# Patient Record
Sex: FEMALE | Race: WHITE | Employment: FULL TIME | URBAN - METROPOLITAN AREA
[De-identification: names, ages, dates, MRNs, and addresses within clinical notes are randomized per-mention and may not be internally consistent; named-entity substitution may affect disease eponyms.]

---

## 2017-08-21 ENCOUNTER — OFFICE VISIT (OUTPATIENT)
Dept: OBGYN CLINIC | Facility: CLINIC | Age: 42
End: 2017-08-21

## 2017-08-21 VITALS
DIASTOLIC BLOOD PRESSURE: 64 MMHG | BODY MASS INDEX: 24.1 KG/M2 | SYSTOLIC BLOOD PRESSURE: 118 MMHG | WEIGHT: 136 LBS | HEIGHT: 63 IN | HEART RATE: 66 BPM

## 2017-08-21 DIAGNOSIS — R53.83 OTHER FATIGUE: ICD-10-CM

## 2017-08-21 DIAGNOSIS — Z01.419 WELL WOMAN EXAM: Primary | ICD-10-CM

## 2017-08-21 DIAGNOSIS — Z12.4 CERVICAL CANCER SCREENING: ICD-10-CM

## 2017-08-21 DIAGNOSIS — Z00.00 ROUTINE HEALTH MAINTENANCE: ICD-10-CM

## 2017-08-21 DIAGNOSIS — N92.3 INTERMENSTRUAL SPOTTING: ICD-10-CM

## 2017-08-21 DIAGNOSIS — Z12.39 BREAST CANCER SCREENING: ICD-10-CM

## 2017-08-21 DIAGNOSIS — N94.6 DYSMENORRHEA: ICD-10-CM

## 2017-08-21 PROCEDURE — 99396 PREV VISIT EST AGE 40-64: CPT | Performed by: OBSTETRICS & GYNECOLOGY

## 2017-08-21 NOTE — PROGRESS NOTES
GYN H&P     2017  1:39 PM    CC: Patient presents with:  Physical: cycle is 25 days instead of 28 to 30, can't focus, nausea, spotting mid cycle, tired      HPI: patient is a 36year old  here for her annual gyne exam.   She has no major compla OR SHORTNESS OF BREATH. Disp: 18 Inhaler Rfl: 0     No current facility-administered medications on file prior to visit.    Family History   Problem Relation Age of Onset   • Heart Disorder Maternal Grandfather      cad -  of mi   • Diabetes Maternal Gr apparent distress  SKIN: no rashes, no suspicious lesions  HEENT: normal  NECK: no thyroidmegaly, no adenopathy  LUNGS: clear to auscultation  CARDIOVASCULAR: normal S1, S2, RRR  BREASTS: soft, nontendder, no palpable masses or nodes, no nipple discharge,

## 2017-09-16 ENCOUNTER — HOSPITAL ENCOUNTER (OUTPATIENT)
Dept: ULTRASOUND IMAGING | Facility: HOSPITAL | Age: 42
Discharge: HOME | End: 2017-09-16
Attending: OBSTETRICS & GYNECOLOGY
Payer: COMMERCIAL

## 2017-09-16 ENCOUNTER — LAB ENCOUNTER (OUTPATIENT)
Dept: LAB | Facility: HOSPITAL | Age: 42
End: 2017-09-16
Attending: OBSTETRICS & GYNECOLOGY
Payer: COMMERCIAL

## 2017-09-16 DIAGNOSIS — N92.3 INTERMENSTRUAL SPOTTING: ICD-10-CM

## 2017-09-16 DIAGNOSIS — R53.83 OTHER FATIGUE: ICD-10-CM

## 2017-09-16 DIAGNOSIS — Z00.00 ROUTINE HEALTH MAINTENANCE: ICD-10-CM

## 2017-09-16 LAB
ALBUMIN SERPL-MCNC: 3.9 G/DL (ref 3.5–4.8)
ALP LIVER SERPL-CCNC: 37 U/L (ref 37–98)
ALT SERPL-CCNC: 18 U/L (ref 14–54)
AST SERPL-CCNC: 8 U/L (ref 15–41)
BASOPHILS # BLD AUTO: 0.03 X10(3) UL (ref 0–0.1)
BASOPHILS NFR BLD AUTO: 0.5 %
BILIRUB SERPL-MCNC: 0.4 MG/DL (ref 0.1–2)
BUN BLD-MCNC: 11 MG/DL (ref 8–20)
CALCIUM BLD-MCNC: 9.4 MG/DL (ref 8.3–10.3)
CHLORIDE: 102 MMOL/L (ref 101–111)
CO2: 26 MMOL/L (ref 22–32)
CREAT BLD-MCNC: 0.64 MG/DL (ref 0.55–1.02)
EOSINOPHIL # BLD AUTO: 0.26 X10(3) UL (ref 0–0.3)
EOSINOPHIL NFR BLD AUTO: 4.1 %
ERYTHROCYTE [DISTWIDTH] IN BLOOD BY AUTOMATED COUNT: 12.4 % (ref 11.5–16)
FREE T4: 1.1 NG/DL (ref 0.9–1.8)
GLUCOSE BLD-MCNC: 89 MG/DL (ref 70–99)
HCT VFR BLD AUTO: 39 % (ref 34–50)
HGB BLD-MCNC: 12.9 G/DL (ref 12–16)
IMMATURE GRANULOCYTE COUNT: 0.01 X10(3) UL (ref 0–1)
IMMATURE GRANULOCYTE RATIO %: 0.2 %
LYMPHOCYTES # BLD AUTO: 1.3 X10(3) UL (ref 0.9–4)
LYMPHOCYTES NFR BLD AUTO: 20.7 %
M PROTEIN MFR SERPL ELPH: 7.7 G/DL (ref 6.1–8.3)
MCH RBC QN AUTO: 29.8 PG (ref 27–33.2)
MCHC RBC AUTO-ENTMCNC: 33.1 G/DL (ref 31–37)
MCV RBC AUTO: 90.1 FL (ref 81–100)
MONOCYTES # BLD AUTO: 0.67 X10(3) UL (ref 0.1–0.6)
MONOCYTES NFR BLD AUTO: 10.7 %
NEUTROPHIL ABS PRELIM: 4.01 X10 (3) UL (ref 1.3–6.7)
NEUTROPHILS # BLD AUTO: 4.01 X10(3) UL (ref 1.3–6.7)
NEUTROPHILS NFR BLD AUTO: 63.8 %
PLATELET # BLD AUTO: 225 10(3)UL (ref 150–450)
POTASSIUM SERPL-SCNC: 3.8 MMOL/L (ref 3.6–5.1)
RBC # BLD AUTO: 4.33 X10(6)UL (ref 3.8–5.1)
RED CELL DISTRIBUTION WIDTH-SD: 41 FL (ref 35.1–46.3)
SODIUM SERPL-SCNC: 137 MMOL/L (ref 136–144)
TSI SER-ACNC: 1.17 MIU/ML (ref 0.35–5.5)
WBC # BLD AUTO: 6.3 X10(3) UL (ref 4–13)

## 2017-09-16 PROCEDURE — 36415 COLL VENOUS BLD VENIPUNCTURE: CPT

## 2017-09-16 PROCEDURE — 84439 ASSAY OF FREE THYROXINE: CPT

## 2017-09-16 PROCEDURE — 80053 COMPREHEN METABOLIC PANEL: CPT

## 2017-09-16 PROCEDURE — 85025 COMPLETE CBC W/AUTO DIFF WBC: CPT

## 2017-09-16 PROCEDURE — 76830 TRANSVAGINAL US NON-OB: CPT | Performed by: OBSTETRICS & GYNECOLOGY

## 2017-09-16 PROCEDURE — 76856 US EXAM PELVIC COMPLETE: CPT | Performed by: OBSTETRICS & GYNECOLOGY

## 2017-09-16 PROCEDURE — 84443 ASSAY THYROID STIM HORMONE: CPT

## 2018-08-16 ENCOUNTER — HOSPITAL ENCOUNTER (OUTPATIENT)
Dept: MAMMOGRAPHY | Facility: HOSPITAL | Age: 43
Discharge: HOME | End: 2018-08-16
Attending: OBSTETRICS & GYNECOLOGY
Payer: COMMERCIAL

## 2018-08-16 DIAGNOSIS — Z12.39 BREAST CANCER SCREENING: ICD-10-CM

## 2018-08-16 PROCEDURE — 77067 SCR MAMMO BI INCL CAD: CPT | Performed by: OBSTETRICS & GYNECOLOGY

## 2018-08-16 PROCEDURE — 77063 BREAST TOMOSYNTHESIS BI: CPT | Performed by: OBSTETRICS & GYNECOLOGY

## 2019-01-12 ENCOUNTER — OFFICE VISIT (OUTPATIENT)
Dept: OBGYN CLINIC | Facility: CLINIC | Age: 44
End: 2019-01-12
Payer: COMMERCIAL

## 2019-01-12 VITALS
WEIGHT: 131.19 LBS | HEART RATE: 74 BPM | SYSTOLIC BLOOD PRESSURE: 110 MMHG | DIASTOLIC BLOOD PRESSURE: 68 MMHG | HEIGHT: 63 IN | BODY MASS INDEX: 23.25 KG/M2

## 2019-01-12 DIAGNOSIS — N93.9 ABNORMAL UTERINE BLEEDING (AUB): ICD-10-CM

## 2019-01-12 DIAGNOSIS — Z12.4 CERVICAL CANCER SCREENING: ICD-10-CM

## 2019-01-12 DIAGNOSIS — Z12.39 BREAST CANCER SCREENING: ICD-10-CM

## 2019-01-12 DIAGNOSIS — Z01.419 WELL WOMAN EXAM WITH ROUTINE GYNECOLOGICAL EXAM: Primary | ICD-10-CM

## 2019-01-12 PROCEDURE — 87624 HPV HI-RISK TYP POOLED RSLT: CPT | Performed by: OBSTETRICS & GYNECOLOGY

## 2019-01-12 PROCEDURE — 99396 PREV VISIT EST AGE 40-64: CPT | Performed by: OBSTETRICS & GYNECOLOGY

## 2019-01-12 RX ORDER — MISOPROSTOL 200 UG/1
200 TABLET ORAL 2 TIMES DAILY
Qty: 2 TABLET | Refills: 0 | Status: SHIPPED | OUTPATIENT
Start: 2019-01-12 | End: 2020-02-11

## 2019-01-12 RX ORDER — MULTIVIT-MIN/IRON FUM/FOLIC AC 7.5 MG-4
1 TABLET ORAL DAILY
COMMUNITY
End: 2021-06-01

## 2019-01-12 NOTE — PROGRESS NOTES
[facility]  Obstetrics and Gynecology  History & Physical    CC: Patient is here for annual well woman exam     Subjective:     HPI: Mayelin Phan is a 39year old  female here for annual well women exam.  Patient reports AUB.  She r Immunization History:     Immunization History  Administered            Date(s) Administered    None  Deferred                Date(s) Deferred    Influenza Vaccine Refused                          04/04/2018      PSH:  Past Surgical History:   Proced Grandfather         cad -  of mi   • Diabetes Maternal Grandmother    • Cancer Father         lung   • Hypertension Neg    • Lipids Neg        Health maintenance:  Mammogram (age 35 and q1-2yr): 2018  Colonoscopy (age 47 and q7yr): n/a    Review of order for screening mammogram provided, due 08/2019  AUB  - a/w irregular spotting   - lab ordered  - pelvic US ordered  - advised endometrial sampling due to persistent symptoms since 2017  - discussion held with the patient regarding EMB procedure includ

## 2019-01-17 LAB
HPV I/H RISK 1 DNA SPEC QL NAA+PROBE: NEGATIVE
LAST PAP RESULT: NORMAL
PAP HISTORY (OTHER THAN LAST PAP): NORMAL

## 2019-01-26 ENCOUNTER — HOSPITAL ENCOUNTER (OUTPATIENT)
Dept: ULTRASOUND IMAGING | Facility: HOSPITAL | Age: 44
Discharge: HOME | End: 2019-01-26
Attending: OBSTETRICS & GYNECOLOGY
Payer: COMMERCIAL

## 2019-01-26 ENCOUNTER — LAB ENCOUNTER (OUTPATIENT)
Dept: LAB | Facility: HOSPITAL | Age: 44
End: 2019-01-26
Attending: OBSTETRICS & GYNECOLOGY
Payer: COMMERCIAL

## 2019-01-26 DIAGNOSIS — N93.9 ABNORMAL UTERINE BLEEDING (AUB): ICD-10-CM

## 2019-01-26 LAB
B-HCG SERPL-ACNC: <1 MIU/ML
BASOPHILS # BLD AUTO: 0.06 X10(3) UL (ref 0–0.1)
BASOPHILS NFR BLD AUTO: 0.8 %
EOSINOPHIL # BLD AUTO: 0.1 X10(3) UL (ref 0–0.3)
EOSINOPHIL NFR BLD AUTO: 1.3 %
ERYTHROCYTE [DISTWIDTH] IN BLOOD BY AUTOMATED COUNT: 12.7 % (ref 11.5–16)
ESTRADIOL: 389.4 PG/ML
FSH SERPL-ACNC: 11.9 MIU/ML
HCT VFR BLD AUTO: 38.1 % (ref 34–50)
HGB BLD-MCNC: 12.2 G/DL (ref 12–16)
IMMATURE GRANULOCYTE COUNT: 0.02 X10(3) UL (ref 0–1)
IMMATURE GRANULOCYTE RATIO %: 0.3 %
LH: 27.2 MIU/ML
LYMPHOCYTES # BLD AUTO: 1.96 X10(3) UL (ref 0.9–4)
LYMPHOCYTES NFR BLD AUTO: 24.9 %
MCH RBC QN AUTO: 29.3 PG (ref 27–33.2)
MCHC RBC AUTO-ENTMCNC: 32 G/DL (ref 31–37)
MCV RBC AUTO: 91.6 FL (ref 81–100)
MONOCYTES # BLD AUTO: 0.79 X10(3) UL (ref 0.1–1)
MONOCYTES NFR BLD AUTO: 10 %
NEUTROPHIL ABS PRELIM: 4.94 X10 (3) UL (ref 1.3–6.7)
NEUTROPHILS # BLD AUTO: 4.94 X10(3) UL (ref 1.3–6.7)
NEUTROPHILS NFR BLD AUTO: 62.7 %
PLATELET # BLD AUTO: 270 10(3)UL (ref 150–450)
PROLACTIN: 15.3 NG/ML
RBC # BLD AUTO: 4.16 X10(6)UL (ref 3.8–5.1)
RED CELL DISTRIBUTION WIDTH-SD: 42.5 FL (ref 35.1–46.3)
TSI SER-ACNC: 1.13 MIU/ML (ref 0.35–5.5)
WBC # BLD AUTO: 7.9 X10(3) UL (ref 4–13)

## 2019-01-26 PROCEDURE — 84146 ASSAY OF PROLACTIN: CPT

## 2019-01-26 PROCEDURE — 82670 ASSAY OF TOTAL ESTRADIOL: CPT

## 2019-01-26 PROCEDURE — 76856 US EXAM PELVIC COMPLETE: CPT | Performed by: OBSTETRICS & GYNECOLOGY

## 2019-01-26 PROCEDURE — 84702 CHORIONIC GONADOTROPIN TEST: CPT

## 2019-01-26 PROCEDURE — 85025 COMPLETE CBC W/AUTO DIFF WBC: CPT

## 2019-01-26 PROCEDURE — 76830 TRANSVAGINAL US NON-OB: CPT | Performed by: OBSTETRICS & GYNECOLOGY

## 2019-01-26 PROCEDURE — 83001 ASSAY OF GONADOTROPIN (FSH): CPT

## 2019-01-26 PROCEDURE — 83002 ASSAY OF GONADOTROPIN (LH): CPT

## 2019-01-26 PROCEDURE — 84443 ASSAY THYROID STIM HORMONE: CPT

## 2019-01-26 PROCEDURE — 36415 COLL VENOUS BLD VENIPUNCTURE: CPT

## 2019-01-28 NOTE — PROGRESS NOTES
Please inform patient of normal AUB lab testing  Pelvic US noted for mild thickened endometrium. Advise to continue with plan for EMB for further evaluation.

## 2019-03-01 ENCOUNTER — OFFICE VISIT (OUTPATIENT)
Dept: OBGYN CLINIC | Facility: CLINIC | Age: 44
End: 2019-03-01
Payer: COMMERCIAL

## 2019-03-01 VITALS
BODY MASS INDEX: 23.04 KG/M2 | SYSTOLIC BLOOD PRESSURE: 112 MMHG | WEIGHT: 130 LBS | HEART RATE: 61 BPM | DIASTOLIC BLOOD PRESSURE: 60 MMHG | HEIGHT: 63 IN

## 2019-03-01 DIAGNOSIS — Z01.812 PRE-PROCEDURE LAB EXAM: ICD-10-CM

## 2019-03-01 DIAGNOSIS — N93.9 ABNORMAL UTERINE BLEEDING (AUB): Primary | ICD-10-CM

## 2019-03-01 LAB — CONTROL LINE PRESENT WITH A CLEAR BACKGROUND (YES/NO): YES YES/NO

## 2019-03-01 PROCEDURE — 88305 TISSUE EXAM BY PATHOLOGIST: CPT | Performed by: OBSTETRICS & GYNECOLOGY

## 2019-03-01 PROCEDURE — 58100 BIOPSY OF UTERUS LINING: CPT | Performed by: OBSTETRICS & GYNECOLOGY

## 2019-03-01 PROCEDURE — 81025 URINE PREGNANCY TEST: CPT | Performed by: OBSTETRICS & GYNECOLOGY

## 2019-03-01 NOTE — PATIENT INSTRUCTIONS
[facility] Department of OB/GYN  After Care Instructions for Endometrial Biopsy      Biopsy Results   You will receive a phone call with your biopsy results in 7 business days. If you have not received your results in 7 days, please contact our office.   The resu pills. Use one of the following approaches:  First day of period start: Start your first pack of pills on the day of your period. No back-up method is required. Sunday start: Start your first pack of pills on the first Sunday after your period begins.   Sulema Barthel vomiting. 3). If you miss your period  • It is not uncommon for your periods to become lighter while taking birth control pills or miss your period all together.   If you missed any pills in the pack prior to this occurring, you should take a home pregnanc If the spotting persists through more than 3 packs of pills, contact your doctor to confirm that you should stay on that brand of pills. 8).  If you need to take any other medications, including antibiotics, check with the pharmacist to see if there will b

## 2019-03-01 NOTE — PROCEDURES
Procedure: Endometrial biopsy     Date of Procedure: 19    Pre-procedure diagnosis:  AUB    Post-procedure diagnosis:   AUB    Indications:   39year old female  who presents for endometrial biopsy / AUB    Procedure details:   The procedure,

## 2019-03-03 PROBLEM — N93.9 ABNORMAL UTERINE BLEEDING (AUB): Status: ACTIVE | Noted: 2019-03-03

## 2019-03-04 RX ORDER — MEDROXYPROGESTERONE ACETATE 10 MG/1
10 TABLET ORAL DAILY
Qty: 10 TABLET | Refills: 0 | Status: SHIPPED | OUTPATIENT
Start: 2019-03-04 | End: 2020-02-11

## 2019-03-04 NOTE — PROGRESS NOTES
Please inform patient of normal EMB negative for malignancy   Polyploid fragments are suggestive of little growths of endometrial tissue which may be cause her intermenstrual bleeding    I would suggest OCP therapy, she may trial OCP for 3 months and then

## 2019-03-04 NOTE — PROGRESS NOTES
39year old female  who presents for endometrial biopsy / AUB. D/w patient management of symptoms including observation, medical and surgical. Patient declines surgical management. She states she might consider OCP.  Patient provided with OCP infor

## 2019-03-05 NOTE — PROGRESS NOTES
Patient notified of results and treatment options. Pt would like to try Provera. Prescription sent to pharmacy.

## 2019-11-13 ENCOUNTER — HOSPITAL ENCOUNTER (OUTPATIENT)
Dept: MAMMOGRAPHY | Facility: HOSPITAL | Age: 44
Discharge: HOME | End: 2019-11-13
Attending: OBSTETRICS & GYNECOLOGY
Payer: COMMERCIAL

## 2019-11-13 DIAGNOSIS — Z12.39 BREAST CANCER SCREENING: ICD-10-CM

## 2019-11-13 PROCEDURE — 77063 BREAST TOMOSYNTHESIS BI: CPT | Performed by: OBSTETRICS & GYNECOLOGY

## 2019-11-13 PROCEDURE — 77067 SCR MAMMO BI INCL CAD: CPT | Performed by: OBSTETRICS & GYNECOLOGY

## 2020-02-11 ENCOUNTER — TELEPHONE (OUTPATIENT)
Dept: OBGYN CLINIC | Facility: CLINIC | Age: 45
End: 2020-02-11

## 2020-02-11 ENCOUNTER — OFFICE VISIT (OUTPATIENT)
Dept: OBGYN CLINIC | Facility: CLINIC | Age: 45
End: 2020-02-11
Payer: COMMERCIAL

## 2020-02-11 VITALS — SYSTOLIC BLOOD PRESSURE: 130 MMHG | DIASTOLIC BLOOD PRESSURE: 82 MMHG | WEIGHT: 119.38 LBS | BODY MASS INDEX: 21 KG/M2

## 2020-02-11 DIAGNOSIS — N76.2 ACUTE VULVITIS: Primary | ICD-10-CM

## 2020-02-11 PROCEDURE — 99213 OFFICE O/P EST LOW 20 MIN: CPT | Performed by: NURSE PRACTITIONER

## 2020-02-11 PROCEDURE — 87480 CANDIDA DNA DIR PROBE: CPT | Performed by: NURSE PRACTITIONER

## 2020-02-11 PROCEDURE — 87510 GARDNER VAG DNA DIR PROBE: CPT | Performed by: NURSE PRACTITIONER

## 2020-02-11 PROCEDURE — 87660 TRICHOMONAS VAGIN DIR PROBE: CPT | Performed by: NURSE PRACTITIONER

## 2020-02-11 NOTE — TELEPHONE ENCOUNTER
38year old patient complaining of vulvar swelling and irritation x 2 days. Symptoms have been worsening. Denies any changes to discharge. Denies any fever/chills. Complaints are all exterior. Is not sure if there are any changes to the appearance.  Isn't

## 2020-02-11 NOTE — PROGRESS NOTES
Gyne note     S: patient is a 38year old yo  here for a 5 day history of vulvar swelling and irritation. She has had occasional itching, slight discharge with odor.  She also notes she had a few episodes where it felt like her skin had cuts that ble

## 2020-02-11 NOTE — TELEPHONE ENCOUNTER
Patient states that she is having vaginal swelling with some burning. She states that it is on the outside of the vagina not in the inside. This has been going on for 2 days.  Please call to advise

## 2020-02-12 ENCOUNTER — TELEPHONE (OUTPATIENT)
Dept: OBGYN CLINIC | Facility: CLINIC | Age: 45
End: 2020-02-12

## 2020-02-12 RX ORDER — FLUCONAZOLE 150 MG/1
TABLET ORAL
Qty: 3 TABLET | Refills: 0 | Status: SHIPPED | OUTPATIENT
Start: 2020-02-12 | End: 2020-11-12 | Stop reason: ALTCHOICE

## 2020-02-12 NOTE — TELEPHONE ENCOUNTER
Patient with + yeast. VO from [facility]: Diflucan 150 mg PO qod x3 doses, #3, no refill. Contacted patient. Reported results and instructed on treatment. Verified pharmacy location and allergies before sending rx. Patient states understanding.

## 2021-05-08 ENCOUNTER — OFFICE VISIT (OUTPATIENT)
Dept: OBGYN CLINIC | Facility: CLINIC | Age: 46
End: 2021-05-08
Payer: COMMERCIAL

## 2021-05-08 VITALS
HEIGHT: 63 IN | HEART RATE: 76 BPM | SYSTOLIC BLOOD PRESSURE: 122 MMHG | BODY MASS INDEX: 23.92 KG/M2 | DIASTOLIC BLOOD PRESSURE: 76 MMHG | WEIGHT: 135 LBS

## 2021-05-08 DIAGNOSIS — N92.1 MENORRHAGIA WITH IRREGULAR CYCLE: ICD-10-CM

## 2021-05-08 DIAGNOSIS — N76.0 BV (BACTERIAL VAGINOSIS): ICD-10-CM

## 2021-05-08 DIAGNOSIS — N93.9 VAGINAL SPOTTING: ICD-10-CM

## 2021-05-08 DIAGNOSIS — R10.2 PELVIC PAIN: ICD-10-CM

## 2021-05-08 DIAGNOSIS — B96.89 BV (BACTERIAL VAGINOSIS): ICD-10-CM

## 2021-05-08 DIAGNOSIS — G89.18 PAIN FOLLOWING SURGERY OR PROCEDURE: ICD-10-CM

## 2021-05-08 DIAGNOSIS — Z12.31 ENCOUNTER FOR SCREENING MAMMOGRAM FOR BREAST CANCER: ICD-10-CM

## 2021-05-08 DIAGNOSIS — N93.9 ABNORMAL UTERINE BLEEDING (AUB): Primary | ICD-10-CM

## 2021-05-08 DIAGNOSIS — N64.4 BREAST PAIN: ICD-10-CM

## 2021-05-08 PROCEDURE — 3078F DIAST BP <80 MM HG: CPT | Performed by: OBSTETRICS & GYNECOLOGY

## 2021-05-08 PROCEDURE — 58100 BIOPSY OF UTERUS LINING: CPT | Performed by: OBSTETRICS & GYNECOLOGY

## 2021-05-08 PROCEDURE — 3074F SYST BP LT 130 MM HG: CPT | Performed by: OBSTETRICS & GYNECOLOGY

## 2021-05-08 PROCEDURE — 99215 OFFICE O/P EST HI 40 MIN: CPT | Performed by: OBSTETRICS & GYNECOLOGY

## 2021-05-08 PROCEDURE — 3008F BODY MASS INDEX DOCD: CPT | Performed by: OBSTETRICS & GYNECOLOGY

## 2021-05-08 PROCEDURE — 87510 GARDNER VAG DNA DIR PROBE: CPT | Performed by: OBSTETRICS & GYNECOLOGY

## 2021-05-08 PROCEDURE — 87660 TRICHOMONAS VAGIN DIR PROBE: CPT | Performed by: OBSTETRICS & GYNECOLOGY

## 2021-05-08 PROCEDURE — 87480 CANDIDA DNA DIR PROBE: CPT | Performed by: OBSTETRICS & GYNECOLOGY

## 2021-05-08 RX ORDER — METRONIDAZOLE 500 MG/1
500 TABLET ORAL 2 TIMES DAILY
Qty: 14 TABLET | Refills: 0 | Status: SHIPPED | OUTPATIENT
Start: 2021-05-08 | End: 2021-06-01

## 2021-05-08 RX ORDER — IBUPROFEN 600 MG/1
600 TABLET ORAL ONCE
Status: SHIPPED | OUTPATIENT
Start: 2021-05-08

## 2021-05-08 NOTE — PATIENT INSTRUCTIONS
You may use Motrin 600 mg by mouth every 6 hours as needed for pain following your procedure. You may also alternate with Tylenol 500 mg by mouth every 6 hours as needed for pain.   Therefore, you may take an over-the-counter pain medication every 3 hours

## 2021-05-08 NOTE — PROCEDURES
Procedure: Endometrial biopsy     Date of Procedure: 21    Pre-procedure diagnosis:  AUB    Post-procedure diagnosis:   AUB    Indications:   51year old female  who presents for endometrial biopsy  AUB    Procedure details:   The procedure,

## 2021-05-08 NOTE — PROGRESS NOTES
[facility]  Obstetrics and Gynecology  Follow Up Progress Note    Subjective:     Mansoor Bonilla is a 51year old  female who was last seen in office 2020 and presents with c/o AUB and breast pain.  The patient was recommended to r HFA (VENTOLIN HFA) 108 (90 Base) MCG/ACT Inhalation Aero Soln, Inhale 2 puffs into the lungs every 6 (six) hours as needed for Wheezing or Shortness of Breath., Disp: 18 g, Rfl: 1  Multiple Vitamins-Minerals (MULTI-VITAMIN/MINERALS) Oral Tab, Take 1 tablet edema    Labs:  03/01/2019  Final Diagnosis:   Endometrial biopsy:  -Polypoid fragments of endometrial mucosa with secretory change.          Imaging:  Pelvic US 01/26/2019  FINDINGS:                  UTERUS:  8.45 cm x 4.61 cm x 4.98 cm     Endometrium Thi results and pelvic ultrasound results  -Patient agreeable  Precautions provided  Left sided pelvic pain   - physical exam wnl   - recommend repeat pelvic US   - precautions provided   Breast pain  -Right-sided breast pain worse with menses  -Physical exam

## 2021-05-27 ENCOUNTER — LAB ENCOUNTER (OUTPATIENT)
Dept: LAB | Facility: REFERENCE LAB | Age: 46
End: 2021-05-27
Attending: OBSTETRICS & GYNECOLOGY
Payer: COMMERCIAL

## 2021-05-27 DIAGNOSIS — N93.9 ABNORMAL UTERINE BLEEDING (AUB): ICD-10-CM

## 2021-05-27 PROCEDURE — 84443 ASSAY THYROID STIM HORMONE: CPT

## 2021-05-27 PROCEDURE — 36415 COLL VENOUS BLD VENIPUNCTURE: CPT

## 2021-05-27 PROCEDURE — 84146 ASSAY OF PROLACTIN: CPT

## 2021-05-27 PROCEDURE — 85025 COMPLETE CBC W/AUTO DIFF WBC: CPT

## 2021-06-01 ENCOUNTER — OFFICE VISIT (OUTPATIENT)
Dept: OBGYN CLINIC | Facility: CLINIC | Age: 46
End: 2021-06-01
Payer: COMMERCIAL

## 2021-06-01 ENCOUNTER — ULTRASOUND ENCOUNTER (OUTPATIENT)
Dept: OBGYN CLINIC | Facility: CLINIC | Age: 46
End: 2021-06-01
Payer: COMMERCIAL

## 2021-06-01 VITALS
SYSTOLIC BLOOD PRESSURE: 118 MMHG | DIASTOLIC BLOOD PRESSURE: 80 MMHG | WEIGHT: 136.81 LBS | BODY MASS INDEX: 24.24 KG/M2 | HEIGHT: 63 IN

## 2021-06-01 DIAGNOSIS — N92.1 MENORRHAGIA WITH IRREGULAR CYCLE: ICD-10-CM

## 2021-06-01 DIAGNOSIS — N93.9 VAGINAL SPOTTING: ICD-10-CM

## 2021-06-01 DIAGNOSIS — N93.9 ABNORMAL UTERINE BLEEDING (AUB): Primary | ICD-10-CM

## 2021-06-01 PROCEDURE — 76856 US EXAM PELVIC COMPLETE: CPT | Performed by: OBSTETRICS & GYNECOLOGY

## 2021-06-01 PROCEDURE — 3079F DIAST BP 80-89 MM HG: CPT | Performed by: OBSTETRICS & GYNECOLOGY

## 2021-06-01 PROCEDURE — 99214 OFFICE O/P EST MOD 30 MIN: CPT | Performed by: OBSTETRICS & GYNECOLOGY

## 2021-06-01 PROCEDURE — 76830 TRANSVAGINAL US NON-OB: CPT | Performed by: OBSTETRICS & GYNECOLOGY

## 2021-06-01 PROCEDURE — 3074F SYST BP LT 130 MM HG: CPT | Performed by: OBSTETRICS & GYNECOLOGY

## 2021-06-01 PROCEDURE — 3008F BODY MASS INDEX DOCD: CPT | Performed by: OBSTETRICS & GYNECOLOGY

## 2021-06-01 RX ORDER — NORETHINDRONE ACETATE AND ETHINYL ESTRADIOL 1MG-20(21)
1 KIT ORAL DAILY
Qty: 84 TABLET | Refills: 3 | Status: SHIPPED | OUTPATIENT
Start: 2021-06-01 | End: 2022-06-01

## 2021-06-01 NOTE — PROGRESS NOTES
[facility]  [facility]  Follow Up Progress Note    Subjective:     Yoly Pereyra is a 51year old  female who was last seen in office 21 and presents with c/o abnormal uterine bleeding, irregular menses with hea menses   • ASTHMA    • Dysmenorrhea    • Esophageal reflux    • Hx of vaginal delivery     2 children, 1 girl, one boy   • Pap smear for cervical cancer screening 1/09,2/10,2/11,2/12,5/17/13    wnl 5/17/13 wnl inflammation   • Vertigo    • [place] teeth ext electronically signed by: Tico Ovalles, 21, 11:07 AM         Assessment:     Nanette Fraser is a 51year old  female who presents for AUB    Patient Active Problem List:     Migraine without aura, without mention of intractable migrain time.    Total patient time was 30 minutes in evaluation, consultation, and coordination of care. Greater than 50% of this time was spent in face to face discussion with the patient. The patient had many questions and concerns that were addressed.        R

## 2021-10-18 ENCOUNTER — HOSPITAL ENCOUNTER (OUTPATIENT)
Dept: MAMMOGRAPHY | Facility: HOSPITAL | Age: 46
Discharge: HOME | End: 2021-10-18
Attending: OBSTETRICS & GYNECOLOGY
Payer: COMMERCIAL

## 2021-10-18 DIAGNOSIS — N64.4 BREAST PAIN: ICD-10-CM

## 2021-10-18 PROCEDURE — 76642 ULTRASOUND BREAST LIMITED: CPT | Performed by: OBSTETRICS & GYNECOLOGY

## 2021-10-18 PROCEDURE — 77062 BREAST TOMOSYNTHESIS BI: CPT | Performed by: OBSTETRICS & GYNECOLOGY

## 2021-10-18 PROCEDURE — 77066 DX MAMMO INCL CAD BI: CPT | Performed by: OBSTETRICS & GYNECOLOGY

## 2022-03-03 ENCOUNTER — OFFICE VISIT (OUTPATIENT)
Dept: OBGYN CLINIC | Facility: CLINIC | Age: 47
End: 2022-03-03
Payer: COMMERCIAL

## 2022-03-03 VITALS
DIASTOLIC BLOOD PRESSURE: 96 MMHG | WEIGHT: 139.25 LBS | HEART RATE: 70 BPM | SYSTOLIC BLOOD PRESSURE: 130 MMHG | HEIGHT: 63 IN | BODY MASS INDEX: 24.67 KG/M2

## 2022-03-03 DIAGNOSIS — N64.4 BREAST PAIN, RIGHT: ICD-10-CM

## 2022-03-03 DIAGNOSIS — R92.1 BREAST CALCIFICATIONS ON MAMMOGRAM: ICD-10-CM

## 2022-03-03 DIAGNOSIS — Z01.419 WELL WOMAN EXAM WITH ROUTINE GYNECOLOGICAL EXAM: Primary | ICD-10-CM

## 2022-03-03 DIAGNOSIS — N93.9 ABNORMAL UTERINE BLEEDING (AUB): ICD-10-CM

## 2022-03-03 DIAGNOSIS — Z12.4 ENCOUNTER FOR SCREENING FOR CERVICAL CANCER: ICD-10-CM

## 2022-03-03 DIAGNOSIS — N60.02 CYST OF LEFT BREAST: ICD-10-CM

## 2022-03-03 PROCEDURE — 99396 PREV VISIT EST AGE 40-64: CPT | Performed by: OBSTETRICS & GYNECOLOGY

## 2022-03-03 PROCEDURE — 3080F DIAST BP >= 90 MM HG: CPT | Performed by: OBSTETRICS & GYNECOLOGY

## 2022-03-03 PROCEDURE — 87624 HPV HI-RISK TYP POOLED RSLT: CPT | Performed by: OBSTETRICS & GYNECOLOGY

## 2022-03-03 PROCEDURE — 3008F BODY MASS INDEX DOCD: CPT | Performed by: OBSTETRICS & GYNECOLOGY

## 2022-03-03 PROCEDURE — 3075F SYST BP GE 130 - 139MM HG: CPT | Performed by: OBSTETRICS & GYNECOLOGY

## 2022-03-03 RX ORDER — NORETHINDRONE ACETATE AND ETHINYL ESTRADIOL 1.5-30(21)
1 KIT ORAL DAILY
Qty: 84 TABLET | Refills: 3 | Status: SHIPPED | OUTPATIENT
Start: 2022-03-03 | End: 2023-03-03

## 2022-03-03 RX ORDER — TRIAMCINOLONE ACETONIDE 55 UG/1
2 SPRAY, METERED NASAL DAILY
COMMUNITY
Start: 2021-10-31

## 2022-03-21 LAB — HPV I/H RISK 1 DNA SPEC QL NAA+PROBE: NEGATIVE

## 2022-03-22 LAB
LAST PAP RESULT: NORMAL
PAP HISTORY (OTHER THAN LAST PAP): NORMAL

## 2022-05-02 ENCOUNTER — HOSPITAL ENCOUNTER (OUTPATIENT)
Dept: MAMMOGRAPHY | Facility: HOSPITAL | Age: 47
Discharge: HOME | End: 2022-05-02
Attending: OBSTETRICS & GYNECOLOGY
Payer: COMMERCIAL

## 2022-05-02 DIAGNOSIS — N64.4 BREAST PAIN, RIGHT: ICD-10-CM

## 2022-05-02 DIAGNOSIS — N60.02 CYST OF LEFT BREAST: ICD-10-CM

## 2022-05-02 DIAGNOSIS — R92.1 BREAST CALCIFICATIONS ON MAMMOGRAM: ICD-10-CM

## 2022-05-02 PROCEDURE — 77066 DX MAMMO INCL CAD BI: CPT | Performed by: OBSTETRICS & GYNECOLOGY

## 2022-05-02 PROCEDURE — 76642 ULTRASOUND BREAST LIMITED: CPT | Performed by: OBSTETRICS & GYNECOLOGY

## 2022-05-02 PROCEDURE — 77062 BREAST TOMOSYNTHESIS BI: CPT | Performed by: OBSTETRICS & GYNECOLOGY

## 2023-01-10 ENCOUNTER — TELEPHONE (OUTPATIENT)
Dept: OBGYN CLINIC | Facility: CLINIC | Age: 48
End: 2023-01-10

## 2023-01-10 DIAGNOSIS — Z12.31 ENCOUNTER FOR SCREENING MAMMOGRAM FOR MALIGNANT NEOPLASM OF BREAST: Primary | ICD-10-CM

## 2023-01-10 NOTE — TELEPHONE ENCOUNTER
Patient has questions regarding what type of mammogram appointment that she needs to schedule.  She would like a nurse to call her back

## 2023-01-10 NOTE — TELEPHONE ENCOUNTER
Per review of chart, patient's last mammo was on 5/2/22- diagnostic with right breast ultrasound. birads 2 with recommendation for routine screening in 12 mos. Dense tissue noted and supplemental screening recommended. Contacted patient. Instructed as above. She states understanding. Desires info regarding supplemental screening via [employer]. Sent.

## (undated) NOTE — LETTER
[facility], :3/8/1975    CONSENT FOR PROCEDURE/SEDATION    1. I authorize the performance upon [address] [place] [address]  the following: Endometrial biopsy procedure    2.  I authorize Dr. Avril Garcia MD (and whomever is designated as the doctor ____________________________________________    Witness: _________________________________________ Date:___________     Physician Signature: _______________________________ Date:___________

## (undated) NOTE — LETTER
[address] [facility], :3/8/1975    CONSENT FOR PROCEDURE/SEDATION    1. I authorize the performance upon [address] [place] [address]  the following: Endometrial biopsy procedure    2.  I authorize Dr. Melissa Varela MD (and whomever is designated as the doctor Relationship to patient: ____________________________________________    Witness: _________________________________________ Date:___________     Physician Signature: _______________________________ Date:___________